# Patient Record
Sex: MALE | Race: OTHER | HISPANIC OR LATINO | URBAN - METROPOLITAN AREA
[De-identification: names, ages, dates, MRNs, and addresses within clinical notes are randomized per-mention and may not be internally consistent; named-entity substitution may affect disease eponyms.]

---

## 2020-11-15 ENCOUNTER — EMERGENCY (EMERGENCY)
Facility: HOSPITAL | Age: 30
LOS: 1 days | Discharge: HOME | End: 2020-11-15
Attending: EMERGENCY MEDICINE
Payer: MEDICAID

## 2020-11-15 VITALS
WEIGHT: 184.97 LBS | DIASTOLIC BLOOD PRESSURE: 72 MMHG | TEMPERATURE: 98 F | SYSTOLIC BLOOD PRESSURE: 125 MMHG | OXYGEN SATURATION: 98 % | HEART RATE: 75 BPM | RESPIRATION RATE: 18 BRPM

## 2020-11-15 PROCEDURE — 87075 CULTR BACTERIA EXCEPT BLOOD: CPT

## 2020-11-15 PROCEDURE — 87205 SMEAR GRAM STAIN: CPT

## 2020-11-15 PROCEDURE — 99283 EMERGENCY DEPT VISIT LOW MDM: CPT | Mod: 25

## 2020-11-15 PROCEDURE — 10061 I&D ABSCESS COMP/MULTIPLE: CPT

## 2020-11-15 PROCEDURE — 99284 EMERGENCY DEPT VISIT MOD MDM: CPT | Mod: 25

## 2020-11-15 PROCEDURE — 87070 CULTURE OTHR SPECIMN AEROBIC: CPT

## 2020-11-15 NOTE — ED PROVIDER NOTE - PHYSICAL EXAMINATION
NAD, VSS, Afebrile, Lungs clear. ABD soft, non tender. No spinal tender. No sciatica tender. +Sebaceous cyst (3x4cm) on lateral thigh with tender and fluctuating swelling. No eryth/ cellulitis, No matt tender. N/V- intact.

## 2020-11-15 NOTE — ED PROVIDER NOTE - NSFOLLOWUPCLINICS_GEN_ALL_ED_FT
[facility]  General Surgery  [address] [place] [address] - [address] [place] [address]  Phone: [phone]  Fax:   Follow Up Time:

## 2020-11-15 NOTE — ED PROVIDER NOTE - ATTENDING CONTRIBUTION TO CARE
30y male no pmh comes to ed c/o swelling left thigh lat for past 1.5y. Recently has increased in size and become painful. No fever chills, dm, habits. PE WDWN male nad left mid thigh lat + sq mobile raised fulctuant mild tender mass with central black point. No surrounding cellulits.   Imp tender mass thigh cw sebacous cyst ? infected. Plan I&D   Antonio Etienne MD, Facep

## 2020-11-15 NOTE — ED PROVIDER NOTE - OBJECTIVE STATEMENT
31yo male pt, no PMHx, ambulatory c.o left thigh swelling for 1.5years and worsening pain/swelling since 2 weeks ago. Denies fever, chills, cough or congestion. Denies injury. Denies sensory changes or weakness to extremities. Denies CP/SOB/ABD pain or N/V/D. Denies urinary problems.

## 2020-11-15 NOTE — ED ADULT NURSE NOTE - OBJECTIVE STATEMENT
2058 pt 30ym aox4 ambulatory to [place] area w/o difficulty c/o lft leg painx 1.5 yrs noted "bone sticking out" noted on the lft upper thigh sebaceous cyst, not bone, states inc for the past 3wks, vss no acute distress

## 2020-11-15 NOTE — ED PROVIDER NOTE - CLINICAL SUMMARY MEDICAL DECISION MAKING FREE TEXT BOX
Cystic structure left thigh past 1.5y now painful tender, Central point cw sebaceous cyst, Had informed consent dw pt and I&D performed showing grossly infected sebaceous material. Pt tolerated well  Antonio Etienne MD, Facep

## 2020-11-15 NOTE — ED PROVIDER NOTE - PATIENT PORTAL LINK FT
You can access the FollowMyHealth Patient Portal offered by [facility] by registering at the following website: [email or web address]. By joining [employer]’s FollowMyHealth portal, you will also be able to view your health information using other applications (apps) compatible with our system.

## 2020-11-17 ENCOUNTER — EMERGENCY (EMERGENCY)
Facility: HOSPITAL | Age: 30
LOS: 1 days | Discharge: HOME | End: 2020-11-17
Attending: STUDENT IN AN ORGANIZED HEALTH CARE EDUCATION/TRAINING PROGRAM | Admitting: STUDENT IN AN ORGANIZED HEALTH CARE EDUCATION/TRAINING PROGRAM
Payer: MEDICAID

## 2020-11-17 VITALS
TEMPERATURE: 98 F | OXYGEN SATURATION: 97 % | HEIGHT: 65 IN | RESPIRATION RATE: 18 BRPM | DIASTOLIC BLOOD PRESSURE: 68 MMHG | SYSTOLIC BLOOD PRESSURE: 126 MMHG | HEART RATE: 68 BPM | WEIGHT: 184.97 LBS

## 2020-11-17 DIAGNOSIS — L02.416 CUTANEOUS ABSCESS OF LEFT LOWER LIMB: ICD-10-CM

## 2020-11-17 PROBLEM — Z78.9 OTHER SPECIFIED HEALTH STATUS: Chronic | Status: ACTIVE | Noted: 2020-11-15

## 2020-11-17 PROCEDURE — G0463: CPT

## 2020-11-17 PROCEDURE — L9995: CPT

## 2020-11-17 NOTE — ED PROVIDER NOTE - PHYSICAL EXAMINATION
General: WDWN, NAD  CV: Pulse rate normal  Pulm: No increased WOB  Abdomen: non-distended  MSK: No limited ROM  Neuro: AAOx3  Skin: L lateral thigh with 1cm incision and packing. Packing removed with serosanguineous discharge. No purulent material noted. No surrounding erythema or induration. +tenderness to wound.

## 2020-11-17 NOTE — ED ADULT TRIAGE NOTE - CHIEF COMPLAINT QUOTE
Wound check from I and D performed at [facility] ED 2 days ago, no fevers or chills

## 2020-11-17 NOTE — ED PROVIDER NOTE - CARE PROVIDER_API CALL
Brooks Proctor)  Internal Medicine  [address], [address]  [place] [address]  Phone: [phone]  Fax: [phone]  Follow Up Time:

## 2020-11-17 NOTE — ED PROVIDER NOTE - NS ED ROS FT
REVIEW OF SYSTEMS:  General: no fever, no chills  HEENT: no headache, no vision changes  Cardiac: no chest pain, no palpitations  Respiratory: no cough, no shortness of breath  Gastrointestinal: no abdominal pain, no nausea, no vomiting, no diarrhea  Genitourinary: no hematuria, no dysuria, no urinary frequency  Extremities: no extremity swelling, no extremity pain  Neuro: no focal weakness, no numbness/tingling of the extremities, no decreased sensation  Heme: no easy bleeding, no easy bruising  Skin: +thigh wound. no jaundice,  no rashes, no lesions  All other ROS as documented in HPI  -Efrem Schmidt, PGY-3

## 2020-11-17 NOTE — ED PROVIDER NOTE - CLINICAL SUMMARY MEDICAL DECISION MAKING FREE TEXT BOX
30M here for wound check. States pain improving. No fevers. No signs of infection on exam. Packing removed and clean bandages placed. Return precautions to ER given. Pt instructed to follow up with PMD/general surgery.

## 2020-11-17 NOTE — ED PROVIDER NOTE - ATTENDING CONTRIBUTION TO CARE
I performed a history and physical exam of the patient and discussed their management with the resident. I reviewed the resident's note and agree with the documented findings and plan of care. My medical decision making and observations are found above.    30M no pmh p/w wound check. Patient had LLE cystic lesion I&D'd 2d ago. Was instructed to return to ER for wound check today. Denies fevers, pain, discharge, erythema, difficulty ambulating, no other concerns. On exam, patient is well appearing, nad. Packing removed from wound site. Cannot express any additional drainage. No erythema/fluctuance. no crepitus, no other sings of infection. Will d/c home with instructions to f/u with pmd. I performed a history and physical exam of the patient and discussed their management with the resident. I reviewed the resident's note and agree with the documented findings and plan of care. My medical decision making and observations are found above.    30M no pmh p/w wound check. Patient had LLE cystic lesion I&D'd 2d ago. Was instructed to return to ER for wound check today. Denies fevers, pain, discharge, erythema, difficulty ambulating, no other concerns. On exam, patient is well appearing, nad. Packing removed from wound site. Cannot express any additional drainage. No erythema/fluctuance. no crepitus, no other sings of infection. culture c/w coag neg staph. No evidence of surrounding cellulitis. Exam not c/w active infection. will hold abx. Will d/c home with instructions to f/u with pmd and strict return precautions.

## 2020-11-17 NOTE — ED PROVIDER NOTE - PATIENT PORTAL LINK FT
You can access the FollowMyHealth Patient Portal offered by [facility] by registering at the following website: [email or web address]. By joining [employer]’s FollowMyHealth portal, you will also be able to view your health information using other applications (apps) compatible with our system.

## 2020-11-17 NOTE — ED ADULT NURSE NOTE - OBJECTIVE STATEMENT
31 yo male presents to er alert and oriented x 3, status post incision and drainage of left lateral thigh abscess on Sunday, told to report to er today for wound check. Denies fevers, chest pain, shortness of breath, dizziness and all other complaints. Respirations even and nonlabored, breathing spontaneously on room air. vital signs stable. Noted left lateral thigh wound with packing, no bleeding noted. Pending provider evaluation/further orders. Will continue to monitor. LARISSA Pina

## 2020-11-17 NOTE — ED PROVIDER NOTE - OBJECTIVE STATEMENT
30M here for wound check. Pt states had mass on L lateral thigh for 1.5yrs which then became acutely more painful. Presented to ER 2 days ago and had I&D and wound packing placed. States pain and swelling has improved since then and is here for wound check. Denies fevers, no worsening redness. 30M here for wound check. Pt states had mass on L lateral thigh for 1.5yrs which then became acutely more painful. Presented to ER 2 days ago and had I&D and wound packing placed. States pain and swelling has improved since then and is here for wound check. Denies fevers, no worsening redness. Abscess culture negative for MRSA

## 2020-11-17 NOTE — ED PROVIDER NOTE - NSFOLLOWUPINSTRUCTIONS_ED_ALL_ED_FT
1. TAKE ALL MEDICATIONS AS DIRECTED.    2. FOR PAIN YOU CAN TAKE IBUPROFEN (MOTRIN, ADVIL) OR ACETAMINOPHEN (TYLENOL) AS NEEDED, AS DIRECTED ON PACKAGING.  3. FOLLOW UP WITH YOUR PRIMARY DOCTOR WITHIN 5 DAYS AS DIRECTED.  4. IF YOU HAD LABS OR IMAGING DONE, YOU WERE GIVEN COPIES OF ALL LABS AND/OR IMAGING RESULTS FROM YOUR ER VISIT--PLEASE TAKE THEM WITH YOU TO YOUR FOLLOW UP APPOINTMENTS.  5. IF NEEDED, CALL PATIENT ACCESS SERVICES AT [phone] TO FIND A PRIMARY CARE PHYSICIAN.  OR CALL [phone] TO MAKE AN APPOINTMENT WITH THE CLINIC.  6. RETURN TO THE ER FOR ANY WORSENING SYMPTOMS OR CONCERNS.     Please return to the ER if you are having severe pain, fevers, thick/white, or foul smelling discharge from your wound.     Please follow up with your primary doctor or general surgery.

## 2020-11-18 ENCOUNTER — EMERGENCY (EMERGENCY)
Facility: HOSPITAL | Age: 30
LOS: 1 days | Discharge: HOME | End: 2020-11-18
Attending: EMERGENCY MEDICINE
Payer: MEDICAID

## 2020-11-18 VITALS
HEART RATE: 77 BPM | OXYGEN SATURATION: 97 % | TEMPERATURE: 98 F | DIASTOLIC BLOOD PRESSURE: 77 MMHG | RESPIRATION RATE: 18 BRPM | SYSTOLIC BLOOD PRESSURE: 124 MMHG

## 2020-11-18 VITALS
HEIGHT: 65 IN | WEIGHT: 184.97 LBS | OXYGEN SATURATION: 100 % | RESPIRATION RATE: 18 BRPM | DIASTOLIC BLOOD PRESSURE: 76 MMHG | TEMPERATURE: 98 F | SYSTOLIC BLOOD PRESSURE: 119 MMHG | HEART RATE: 63 BPM

## 2020-11-18 PROCEDURE — 99282 EMERGENCY DEPT VISIT SF MDM: CPT

## 2020-11-18 NOTE — ED PROVIDER NOTE - PATIENT PORTAL LINK FT
You can access the FollowMyHealth Patient Portal offered by [facility] by registering at the following website: [email or web address]. By joining [employer]’s FollowMyHealth portal, you will also be able to view your health information using other applications (apps) compatible with our system.

## 2020-11-18 NOTE — ED PROVIDER NOTE - OBJECTIVE STATEMENT
Attn - pt seen in OR3 - pt s/p I&D left thigh abscess and concerned may be a clot in the I&D site.  pain is less after I&D. not given Abx, no fever.

## 2020-11-18 NOTE — ED PROVIDER NOTE - NSFOLLOWUPINSTRUCTIONS_ED_ALL_ED_FT
Hot compresses to area 20 mins on every 2-4 hours for the next 2-3 days.  Return to ER if develop fever or increased pain.

## 2020-11-18 NOTE — ED PROVIDER NOTE - CLINICAL SUMMARY MEDICAL DECISION MAKING FREE TEXT BOX
Attn - wound check s/p I&D left anterior thigh - healing well.  advised addition of hot compresses to area.

## 2020-11-18 NOTE — ED PROVIDER NOTE - PHYSICAL EXAMINATION
Attn - alert, nad, I&D site left ant/lat prox thigh with no erythema and sl. induration.  no pus or bleeding.

## 2020-11-18 NOTE — ED ADULT NURSE NOTE - OBJECTIVE STATEMENT
30 y male presents from home for wound checks; was seen yesterday for removal of packing for abscess in left lateral thigh. Reports to blood clot in wound site. No drainage/ swelling/ bleeding at site of abscess. Wound dressing clean dry and intact. Denies fevers, chills, lightheadedness, dizziness, N/V/D. A&OX3. Skin warm and dry. Breathing comfortably in bed- no distress. Safety maintained- bed locked in lowest position.

## 2022-12-07 NOTE — ED ADULT NURSE NOTE - CAS EDN DISCHARGE ASSESSMENT
H&P reviewed  After examining the patient I find no changes in the patients condition since the H&P had been written      Vitals:    12/07/22 0803   BP: 119/77   Pulse: 85   Resp: 18   Temp: 97 7 °F (36 5 °C)   SpO2: 98%
Alert and oriented to person, place and time

## 2024-12-17 NOTE — ED PROCEDURE NOTE - ATTENDING CONTRIBUTION TO CARE
\"Have you been to the ER, urgent care clinic since your last visit?  Hospitalized since your last visit?\"    NO    “Have you seen or consulted any other health care providers outside our system since your last visit?”    NO      “Have you had a colorectal cancer screening such as a colonoscopy/FIT/Cologuard?    NO    No colonoscopy on file  No cologuard on file  No FIT/FOBT on file   No flexible sigmoidoscopy on file     “Have you had a diabetic eye exam?”    NO     Date of last diabetic eye exam: 2/25/2021           I&D w NP without complications  Antonio Etienne MD, Facep